# Patient Record
Sex: MALE | Race: WHITE | NOT HISPANIC OR LATINO | URBAN - METROPOLITAN AREA
[De-identification: names, ages, dates, MRNs, and addresses within clinical notes are randomized per-mention and may not be internally consistent; named-entity substitution may affect disease eponyms.]

---

## 2023-10-06 ENCOUNTER — OUTPATIENT (OUTPATIENT)
Dept: OUTPATIENT SERVICES | Facility: HOSPITAL | Age: 33
LOS: 1 days | Discharge: STILL ADMITTED OR ADMINISTRATIVE | End: 2023-10-06
Payer: COMMERCIAL

## 2023-10-06 LAB
FLUAV AG NPH QL: SIGNIFICANT CHANGE UP
FLUBV AG NPH QL: SIGNIFICANT CHANGE UP
RSV RNA NPH QL NAA+NON-PROBE: SIGNIFICANT CHANGE UP
SARS-COV-2 RNA SPEC QL NAA+PROBE: SIGNIFICANT CHANGE UP

## 2023-10-06 PROCEDURE — 90839 PSYTX CRISIS INITIAL 60 MIN: CPT

## 2023-12-04 PROBLEM — Z00.00 ENCOUNTER FOR PREVENTIVE HEALTH EXAMINATION: Status: ACTIVE | Noted: 2023-12-04

## 2024-03-04 RX ORDER — BENZTROPINE MESYLATE 1 MG
1.5 TABLET ORAL
Qty: 45 | Refills: 0
Start: 2024-03-04 | End: 2024-04-02

## 2024-03-04 RX ORDER — BENZTROPINE MESYLATE 1 MG
1.5 TABLET ORAL
Qty: 45 | Refills: 0
Start: 2024-03-04 | End: 2024-05-18

## 2024-03-04 RX ORDER — IPRATROPIUM BROMIDE 21 MCG
2 AEROSOL, SPRAY (ML) NASAL
Qty: 1 | Refills: 0
Start: 2024-03-04 | End: 2024-04-02

## 2024-03-07 RX ORDER — BUPROPION HYDROCHLORIDE 150 MG/1
3 TABLET, EXTENDED RELEASE ORAL
Qty: 90 | Refills: 0
Start: 2024-03-07 | End: 2024-07-02

## 2024-03-07 RX ORDER — BUPROPION HYDROCHLORIDE 150 MG/1
3 TABLET, EXTENDED RELEASE ORAL
Qty: 90 | Refills: 0
Start: 2024-03-07 | End: 2024-04-05

## 2024-03-07 RX ORDER — BUPROPION HYDROCHLORIDE 150 MG/1
1 TABLET, EXTENDED RELEASE ORAL
Qty: 30 | Refills: 0 | DISCHARGE
Start: 2024-03-07 | End: 2024-04-05

## 2024-03-07 RX ORDER — BUPROPION HYDROCHLORIDE 150 MG/1
1 TABLET, EXTENDED RELEASE ORAL
Qty: 30 | Refills: 0
Start: 2024-03-07 | End: 2024-04-05

## 2024-03-20 RX ORDER — CLOZAPINE 150 MG/1
1 TABLET, ORALLY DISINTEGRATING ORAL
Qty: 60 | Refills: 0
Start: 2024-03-20 | End: 2024-04-18

## 2024-03-20 RX ORDER — CLOZAPINE 150 MG/1
2 TABLET, ORALLY DISINTEGRATING ORAL
Qty: 60 | Refills: 0
Start: 2024-03-20 | End: 2024-04-18

## 2024-03-20 RX ORDER — ARIPIPRAZOLE 15 MG/1
1 TABLET ORAL
Qty: 30 | Refills: 0
Start: 2024-03-20 | End: 2024-04-18

## 2024-03-20 RX ORDER — BENZTROPINE MESYLATE 1 MG
1 TABLET ORAL
Qty: 30 | Refills: 0
Start: 2024-03-20 | End: 2024-04-18

## 2024-03-20 RX ORDER — ARIPIPRAZOLE 15 MG/1
1 TABLET ORAL
Qty: 30 | Refills: 0 | DISCHARGE
Start: 2024-03-20 | End: 2024-04-18

## 2024-03-20 RX ORDER — ARIPIPRAZOLE 15 MG/1
1 TABLET ORAL
Qty: 30 | Refills: 0
Start: 2024-03-20 | End: 2024-05-18

## 2024-04-04 RX ORDER — BUPROPION HYDROCHLORIDE 150 MG/1
3 TABLET, EXTENDED RELEASE ORAL
Qty: 90 | Refills: 0
Start: 2024-04-04 | End: 2024-05-03

## 2024-04-14 ENCOUNTER — INPATIENT (INPATIENT)
Facility: HOSPITAL | Age: 34
LOS: 1 days | Discharge: HOME | End: 2024-04-16
Attending: INTERNAL MEDICINE | Admitting: INTERNAL MEDICINE
Payer: MEDICAID

## 2024-04-14 VITALS
RESPIRATION RATE: 16 BRPM | OXYGEN SATURATION: 100 % | HEART RATE: 85 BPM | DIASTOLIC BLOOD PRESSURE: 76 MMHG | SYSTOLIC BLOOD PRESSURE: 115 MMHG | TEMPERATURE: 98 F

## 2024-04-14 DIAGNOSIS — R25.1 TREMOR, UNSPECIFIED: ICD-10-CM

## 2024-04-14 LAB
ALBUMIN SERPL ELPH-MCNC: 4.4 G/DL — SIGNIFICANT CHANGE UP (ref 3.3–5)
ALP SERPL-CCNC: 82 U/L — SIGNIFICANT CHANGE UP (ref 40–120)
ALT FLD-CCNC: 39 U/L — SIGNIFICANT CHANGE UP (ref 4–41)
AMPHET UR-MCNC: NEGATIVE — SIGNIFICANT CHANGE UP
ANION GAP SERPL CALC-SCNC: 10 MMOL/L — SIGNIFICANT CHANGE UP (ref 7–14)
APAP SERPL-MCNC: <10 UG/ML — LOW (ref 15–25)
APPEARANCE UR: CLEAR — SIGNIFICANT CHANGE UP
AST SERPL-CCNC: 23 U/L — SIGNIFICANT CHANGE UP (ref 4–40)
BACTERIA # UR AUTO: NEGATIVE /HPF — SIGNIFICANT CHANGE UP
BARBITURATES UR SCN-MCNC: NEGATIVE — SIGNIFICANT CHANGE UP
BASOPHILS # BLD AUTO: 0.03 K/UL — SIGNIFICANT CHANGE UP (ref 0–0.2)
BASOPHILS NFR BLD AUTO: 0.7 % — SIGNIFICANT CHANGE UP (ref 0–2)
BENZODIAZ UR-MCNC: NEGATIVE — SIGNIFICANT CHANGE UP
BILIRUB SERPL-MCNC: 0.2 MG/DL — SIGNIFICANT CHANGE UP (ref 0.2–1.2)
BILIRUB UR-MCNC: NEGATIVE — SIGNIFICANT CHANGE UP
BUN SERPL-MCNC: 17 MG/DL — SIGNIFICANT CHANGE UP (ref 7–23)
CALCIUM SERPL-MCNC: 9.4 MG/DL — SIGNIFICANT CHANGE UP (ref 8.4–10.5)
CAST: 0 /LPF — SIGNIFICANT CHANGE UP (ref 0–4)
CHLORIDE SERPL-SCNC: 105 MMOL/L — SIGNIFICANT CHANGE UP (ref 98–107)
CO2 SERPL-SCNC: 27 MMOL/L — SIGNIFICANT CHANGE UP (ref 22–31)
COCAINE METAB.OTHER UR-MCNC: NEGATIVE — SIGNIFICANT CHANGE UP
COLOR SPEC: YELLOW — SIGNIFICANT CHANGE UP
CREAT SERPL-MCNC: 0.91 MG/DL — SIGNIFICANT CHANGE UP (ref 0.5–1.3)
CREATININE URINE RESULT, DAU: 60 MG/DL — SIGNIFICANT CHANGE UP
DIFF PNL FLD: NEGATIVE — SIGNIFICANT CHANGE UP
EGFR: 114 ML/MIN/1.73M2 — SIGNIFICANT CHANGE UP
EOSINOPHIL # BLD AUTO: 0.28 K/UL — SIGNIFICANT CHANGE UP (ref 0–0.5)
EOSINOPHIL NFR BLD AUTO: 6.3 % — HIGH (ref 0–6)
ETHANOL SERPL-MCNC: <10 MG/DL — SIGNIFICANT CHANGE UP
GLUCOSE SERPL-MCNC: 107 MG/DL — HIGH (ref 70–99)
GLUCOSE UR QL: NEGATIVE MG/DL — SIGNIFICANT CHANGE UP
HCT VFR BLD CALC: 41.5 % — SIGNIFICANT CHANGE UP (ref 39–50)
HGB BLD-MCNC: 13.9 G/DL — SIGNIFICANT CHANGE UP (ref 13–17)
IANC: 2.89 K/UL — SIGNIFICANT CHANGE UP (ref 1.8–7.4)
IMM GRANULOCYTES NFR BLD AUTO: 0.2 % — SIGNIFICANT CHANGE UP (ref 0–0.9)
KETONES UR-MCNC: NEGATIVE MG/DL — SIGNIFICANT CHANGE UP
LEUKOCYTE ESTERASE UR-ACNC: NEGATIVE — SIGNIFICANT CHANGE UP
LYMPHOCYTES # BLD AUTO: 0.84 K/UL — LOW (ref 1–3.3)
LYMPHOCYTES # BLD AUTO: 19 % — SIGNIFICANT CHANGE UP (ref 13–44)
MAGNESIUM SERPL-MCNC: 2.1 MG/DL — SIGNIFICANT CHANGE UP (ref 1.6–2.6)
MCHC RBC-ENTMCNC: 30.5 PG — SIGNIFICANT CHANGE UP (ref 27–34)
MCHC RBC-ENTMCNC: 33.5 GM/DL — SIGNIFICANT CHANGE UP (ref 32–36)
MCV RBC AUTO: 91 FL — SIGNIFICANT CHANGE UP (ref 80–100)
METHADONE UR-MCNC: NEGATIVE — SIGNIFICANT CHANGE UP
MONOCYTES # BLD AUTO: 0.36 K/UL — SIGNIFICANT CHANGE UP (ref 0–0.9)
MONOCYTES NFR BLD AUTO: 8.2 % — SIGNIFICANT CHANGE UP (ref 2–14)
NEUTROPHILS # BLD AUTO: 2.89 K/UL — SIGNIFICANT CHANGE UP (ref 1.8–7.4)
NEUTROPHILS NFR BLD AUTO: 65.6 % — SIGNIFICANT CHANGE UP (ref 43–77)
NITRITE UR-MCNC: NEGATIVE — SIGNIFICANT CHANGE UP
NRBC # BLD: 0 /100 WBCS — SIGNIFICANT CHANGE UP (ref 0–0)
NRBC # FLD: 0 K/UL — SIGNIFICANT CHANGE UP (ref 0–0)
OPIATES UR-MCNC: NEGATIVE — SIGNIFICANT CHANGE UP
OXYCODONE UR-MCNC: NEGATIVE — SIGNIFICANT CHANGE UP
PCP SPEC-MCNC: SIGNIFICANT CHANGE UP
PCP UR-MCNC: NEGATIVE — SIGNIFICANT CHANGE UP
PH UR: 7 — SIGNIFICANT CHANGE UP (ref 5–8)
PHOSPHATE SERPL-MCNC: 3 MG/DL — SIGNIFICANT CHANGE UP (ref 2.5–4.5)
PLATELET # BLD AUTO: 241 K/UL — SIGNIFICANT CHANGE UP (ref 150–400)
POTASSIUM SERPL-MCNC: 4.3 MMOL/L — SIGNIFICANT CHANGE UP (ref 3.5–5.3)
POTASSIUM SERPL-SCNC: 4.3 MMOL/L — SIGNIFICANT CHANGE UP (ref 3.5–5.3)
PROT SERPL-MCNC: 7.3 G/DL — SIGNIFICANT CHANGE UP (ref 6–8.3)
PROT UR-MCNC: SIGNIFICANT CHANGE UP MG/DL
RBC # BLD: 4.56 M/UL — SIGNIFICANT CHANGE UP (ref 4.2–5.8)
RBC # FLD: 12.2 % — SIGNIFICANT CHANGE UP (ref 10.3–14.5)
RBC CASTS # UR COMP ASSIST: 0 /HPF — SIGNIFICANT CHANGE UP (ref 0–4)
SALICYLATES SERPL-MCNC: <0.3 MG/DL — LOW (ref 15–30)
SODIUM SERPL-SCNC: 142 MMOL/L — SIGNIFICANT CHANGE UP (ref 135–145)
SP GR SPEC: 1.02 — SIGNIFICANT CHANGE UP (ref 1–1.03)
SQUAMOUS # UR AUTO: 0 /HPF — SIGNIFICANT CHANGE UP (ref 0–5)
THC UR QL: NEGATIVE — SIGNIFICANT CHANGE UP
TOXICOLOGY SCREEN, DRUGS OF ABUSE, SERUM RESULT: SIGNIFICANT CHANGE UP
UROBILINOGEN FLD QL: 1 MG/DL — SIGNIFICANT CHANGE UP (ref 0.2–1)
WBC # BLD: 4.41 K/UL — SIGNIFICANT CHANGE UP (ref 3.8–10.5)
WBC # FLD AUTO: 4.41 K/UL — SIGNIFICANT CHANGE UP (ref 3.8–10.5)
WBC UR QL: 0 /HPF — SIGNIFICANT CHANGE UP (ref 0–5)

## 2024-04-14 PROCEDURE — 99223 1ST HOSP IP/OBS HIGH 75: CPT

## 2024-04-14 PROCEDURE — 71046 X-RAY EXAM CHEST 2 VIEWS: CPT | Mod: 26

## 2024-04-14 PROCEDURE — 99285 EMERGENCY DEPT VISIT HI MDM: CPT

## 2024-04-14 RX ORDER — BUPROPION HYDROCHLORIDE 150 MG/1
450 TABLET, EXTENDED RELEASE ORAL DAILY
Refills: 0 | Status: DISCONTINUED | OUTPATIENT
Start: 2024-04-14 | End: 2024-04-15

## 2024-04-14 RX ORDER — CLOZAPINE 150 MG/1
550 TABLET, ORALLY DISINTEGRATING ORAL AT BEDTIME
Refills: 0 | Status: DISCONTINUED | OUTPATIENT
Start: 2024-04-14 | End: 2024-04-14

## 2024-04-14 RX ORDER — CLOZAPINE 150 MG/1
550 TABLET, ORALLY DISINTEGRATING ORAL
Refills: 0 | DISCHARGE

## 2024-04-14 RX ORDER — INFLUENZA VIRUS VACCINE 15; 15; 15; 15 UG/.5ML; UG/.5ML; UG/.5ML; UG/.5ML
0.5 SUSPENSION INTRAMUSCULAR ONCE
Refills: 0 | Status: DISCONTINUED | OUTPATIENT
Start: 2024-04-14 | End: 2024-04-16

## 2024-04-14 RX ORDER — ARIPIPRAZOLE 15 MG/1
10 TABLET ORAL AT BEDTIME
Refills: 0 | Status: DISCONTINUED | OUTPATIENT
Start: 2024-04-14 | End: 2024-04-15

## 2024-04-14 RX ORDER — CLOZAPINE 150 MG/1
100 TABLET, ORALLY DISINTEGRATING ORAL DAILY
Refills: 0 | Status: DISCONTINUED | OUTPATIENT
Start: 2024-04-14 | End: 2024-04-15

## 2024-04-14 RX ORDER — BENZTROPINE MESYLATE 1 MG
2 TABLET ORAL AT BEDTIME
Refills: 0 | Status: DISCONTINUED | OUTPATIENT
Start: 2024-04-14 | End: 2024-04-16

## 2024-04-14 RX ORDER — CLOZAPINE 150 MG/1
450 TABLET, ORALLY DISINTEGRATING ORAL DAILY
Refills: 0 | Status: DISCONTINUED | OUTPATIENT
Start: 2024-04-14 | End: 2024-04-15

## 2024-04-14 RX ADMIN — CLOZAPINE 450 MILLIGRAM(S): 150 TABLET, ORALLY DISINTEGRATING ORAL at 23:50

## 2024-04-14 RX ADMIN — Medication 2 MILLIGRAM(S): at 23:12

## 2024-04-14 RX ADMIN — CLOZAPINE 100 MILLIGRAM(S): 150 TABLET, ORALLY DISINTEGRATING ORAL at 23:50

## 2024-04-14 RX ADMIN — ARIPIPRAZOLE 10 MILLIGRAM(S): 15 TABLET ORAL at 23:12

## 2024-04-14 NOTE — H&P ADULT - ASSESSMENT
33 y/o M with history of mood disorder with depression and anxiety p/w  involuntary spasms and legs giving out.

## 2024-04-14 NOTE — ED PROVIDER NOTE - OBJECTIVE STATEMENT
34m presenting to the ed with concern for involuntary spasms and legs giving out.  Patient reports he woke up feeling at baseline, and today while standing, felt his legs give out. Felt generally shaky and also that different parts of his body are spasming involuntarily. He fell at the time, landed on his behind, did not hit his head, no loc, and did not feel lightheaded or dizzy. Reports this happened multiple times that his body is having involuntary jerks and spasms and has several near falls due to this (though did not fall again). Has been fully conscious through all episodes, is still continuing to experience smaller jerks in the bed, no ha, vision changes, unilateral weakness, numbness, back pain, cp, sob, cough, fevers, vomiting, diarrhea, dysuria. This never happened before. no urinary or fecal incont during episodes. Takes multiple medications including

## 2024-04-14 NOTE — H&P ADULT - NSHPLABSRESULTS_GEN_ALL_CORE
13.9   4.41  )-----------( 241      ( 2024 14:50 )             41.5     142  |  105  |  17  ----------------------------<  107<H>       4.3   |  27  |  0.91    Ca    9.4      2024 14:50  Phos  3.0       Mg     2.10         TPro  7.3  /  Alb  4.4  /  TBili  0.2  /  DBili  x   /  AST  23  /  ALT  39  /  AlkPhos  82                Urinalysis Basic - ( 2024 15:24 )  Color: Yellow / Appearance: Clear / S.016 / pH: 7.0  Gluc: Negative mg/dL / Ketone: Negative mg/dL  / Bili: Negative / Urobili: 1.0 mg/dL   Blood: Negative / Protein: Trace mg/dL / Nitrite: Negative   Leuk Esterase: Negative / RBC: 0 /HPF / WBC 0 /HPF   Sq Epi: x / Non Sq Epi: x / Bacteria: Negative /HPF

## 2024-04-14 NOTE — ED ADULT NURSE NOTE - NSFALLRISKINTERV_ED_ALL_ED

## 2024-04-14 NOTE — CONSULT NOTE ADULT - SUBJECTIVE AND OBJECTIVE BOX
Neurology - Consult Note    Spectra: [phone] ([facility]), [phone] ([facility])    HPI: 34-year old past medical history of anxiety/depression presenting to the ED for c/f spasms and legs giving out.  Neurology consulted for spasms.    Patient + father at bedside reports 1100 on day of presentation, patient's legs 'buckled' without any prior notice, and he fell onto his behind, did not injure his head. Reports he has had several near falls since then. Also advocates between 1100 to 1300, had multiple episodes (~20) of both L + R arm jerking lasting a few seconds in duration, sometimes asociated with the legs buckling, sometimes only involving the upper extremity. Reports this has never happened prior to today.    Reports he has been fully aware during this episodes, no tongue bite/incont.  Medication changes: reports abilify was increased from 5->10 2 weeks prior, lorazepam was decreased from 1mg -> 0.5 mg 2 weeks prior. Reports 3-4 weeks prior clozapine was decreased from 600 -> 550. Cogentin was started (2mg) 8 weeks prior to presentation for movement, father + patient advocate for 'walking + arm swinging,' no documentation available per chart review. Follows with a psychiatrist outpt does not see a neurologist.  No CNS/febrile seizures/birthabnl/FHseizures. No substance use.    Vitals stable  Labs unremarkable tox negative UA negative    Review of Systems:  NEUROLOGICAL: +As stated in HPI above  All other review of systems is negative unless indicated above.    Allergies:  No Known Allergies      PMHx/PSHx/Family Hx: As above, otherwise see below   History of anxiety    H/O: depression        Social Hx:  as above    Vitals:  T(C): 36.6 (04-14-24 @ 15:49), Max: 36.7 (04-14-24 @ 13:37)  HR: 78 (04-14-24 @ 15:49) (78 - 85)  BP: 114/78 (04-14-24 @ 15:49) (114/78 - 115/76)  RR: 16 (04-14-24 @ 15:49) (16 - 16)  SpO2: 100% (04-14-24 @ 15:49) (100% - 100%)    Physical Examination:  General - non-toxic appearing male in no acute distress  Neurologic Exam:  Mental status - Awake, Alert, Oriented to person, time, states '[facility] [place]' for location. Speech fluent, repetition and naming intact. Follows simple commands. Delayed/slightly flat affeect.  Cranial nerves - PERRLA (5mm -> 4.5mm b/l), VFF, EOMI, face sensation (V1-V3) intact b/l, facial strength intact without asymmetry b/l, hearing intact b/l, palate with symmetric elevation,  sternocleidomastiod 5/5 strength b/l, tongue midline on protrusion with full lateral movement  Motor - Normal bulk and tone throughout. No pronator drift.  Strength testing            Deltoid      Biceps      Triceps     Wrist Extension    Wrist Flexion            R            5                 5               5                     5                              5                        5  L             5                 5               5                     5                              5                         5              Hip Flexion       Knee Flexion    Knee Extension    Dorsiflexion    Plantar Flexion  R              5                            5                           5                            5                          5  L              5                            5                           5                            5                          5  Sensation - Light touch intact throughout  DTR's -             Biceps      Triceps     Brachioradialis      Patellar    Ankle    Toes  R             2+             2+                  2+                       2+            2+                  L              2+             2+                 2+                        2+           2+                  Coordination - Finger to Nose intact b/l. No tremors appreciated  Gait and station - Normal casual gait.     Labs:                        13.9   4.41  )-----------( 241      ( 14 Apr 2024 14:50 )             41.5     04-14    142  |  105  |  17  ----------------------------<  107<H>  4.3   |  27  |  0.91    Ca    9.4      14 Apr 2024 14:50  Phos  3.0     04-14  Mg     2.10     04-14    TPro  7.3  /  Alb  4.4  /  TBili  0.2  /  DBili  x   /  AST  23  /  ALT  39  /  AlkPhos  82  04-14    CAPILLARY BLOOD GLUCOSE        LIVER FUNCTIONS - ( 14 Apr 2024 14:50 )  Alb: 4.4 g/dL / Pro: 7.3 g/dL / ALK PHOS: 82 U/L / ALT: 39 U/L / AST: 23 U/L / GGT: x                 Radiology:

## 2024-04-14 NOTE — ED ADULT NURSE NOTE - NS ED NURSE REPORT GIVEN DT
Patient returned call and I relayed message below. Patient states that he has a hard time falling asleep at night and once asleep he only sleeps a couple hours at a time. Also for the constipation patient states that he thought that the preparation was working for a couple days and no longer working  and states he has a lot of blood when he has bowl movements. Please advise and call patient if needed please and thank you.   14-Apr-2024 21:48

## 2024-04-14 NOTE — H&P ADULT - NSHPPHYSICALEXAM_GEN_ALL_CORE
Vital Signs Last 24 Hrs  T(C): 36.6 (14 Apr 2024 23:30), Max: 36.7 (14 Apr 2024 13:37)  T(F): 97.8 (14 Apr 2024 23:30), Max: 98 (14 Apr 2024 13:37)  HR: 115 (14 Apr 2024 23:30) (74 - 115)  BP: 102/64 (14 Apr 2024 23:30) (102/64 - 117/76)  BP(mean): --  RR: 18 (14 Apr 2024 23:30) (15 - 18)  SpO2: 100% (14 Apr 2024 23:30) (100% - 100%)    Parameters below as of 14 Apr 2024 23:30  Patient On (Oxygen Delivery Method): room air        PHYSICAL EXAM:  GENERAL: No Acute Distress  EYES: conjunctiva and sclera clear  ENMT: Moist mucous membranes   NECK: Supple  PULMONARY: Clear to auscultation bilaterally  CARDIAC: Regular rate and rhythm; No murmurs, rubs, or gallops  GASTROINTESTINAL: Abdomen soft, Nontender, Nondistended; Bowel sounds normal  EXTREMITIES:   No clubbing, cyanosis, or pedal edema  PSYCH: constricted affect  NEUROLOGY:   - Mental status A&O x 3  - occasional myoclonic jerk noted  SKIN: No rashes or lesions  MUSCULOSKELETAL: No joint swelling

## 2024-04-14 NOTE — ED PROVIDER NOTE - PHYSICAL EXAMINATION
GEN - NAD; nontoxic appearing; A+O x3   HEAD - NC/AT   EYES- PERRL, EOMI  ENT: Airway patent, mmm, Oral cavity and pharynx normal. No inflammation, swelling, exudate, or lesions.    NECK: Neck supple  PULMONARY - CTA b/l, symmetric breath sounds.   CARDIAC -s1s2, RRR, no M,G,R  ABDOMEN - +BS, ND, NT, soft, no guarding, no rebound, no masses   BACK - no CVA tenderness, Normal  spine   EXTREMITIES - FROM, symmetric pulses, capillary refill < 2 seconds, no edema   SKIN - no rash or bruising   NEUROLOGIC - ao3, cn2-12 intact, 5/5 strength in all extremities, sensation grossly intact, f-n nl, +noted facial isolated and intermittent facial/mouth spasms during evaluation  PSYCH -linear, calm, cooperative, in good behavioral control

## 2024-04-14 NOTE — ED ADULT NURSE NOTE - CHIEF COMPLAINT QUOTE
pt s family brought pt in after experiencing seizures pt had multiple ana hand movements that's not normal for him  told by [facility] docs to bring pt in pt has pych issues

## 2024-04-14 NOTE — ED ADULT NURSE NOTE - OBJECTIVE STATEMENT
35 y/o M arrives to E.D. spot 1-A c/o involuntary hand movements (chronic, but worse this AM), and bilat leg weakness that began this AM. PMHx: anxiety, depression (clozapine, aripiprazole, buproprion, ativan, cogentin). Pt is a&ox4, ambulatory at baseline, neg SOB, denies CP, neg N/V/D. L 20g IV placed to AC. Endorses falling this AM d/t bilat leg weakness. Denies head injury or LOC. Denies S/I, H/I, hallucinations or ETOH/drug use. Calm & cooperative. Mother at bedside. Frequent rounding in place.

## 2024-04-14 NOTE — CONSULT NOTE ADULT - ASSESSMENT
ASSESSMENT   34-year old past medical history of anxiety/depression presenting to the ED for c/f spasms and legs giving out.  Neurology consulted for spasms.    IMPRESSION   Involuntary, intermittent episodes of 'spasms', may be myoclonic jerks. Etiology pending further workup, may be medication related or metabolic. Would rule out epileptic events, though lower suspicion for. No clinical findings for myelopathy or acute ischemic event.    RECOMMENDATION   [] vEEG  [] no need for anti-seizure medications at this time  [] please obtain TSH, vitamin E    Discussed with on call neurology attending.  Patient to be seen by team and attending. Note finalized upon attending attestation.

## 2024-04-14 NOTE — ED PROVIDER NOTE - CLINICAL SUMMARY MEDICAL DECISION MAKING FREE TEXT BOX
Involuntary spasms/jerking/b/l leg buckling since this morning with one fall (no head trauma/loc/dizziness), and several near falls due to jerking. No loc, retains fully memory of these event, no trauma or injury from fall, no e/o injury on exam. Plan for labs and neuro c/s to eval for elec dist/organ dysfunction/med effects, f/u neuro recs for poss further investigation. Patient, mother, and father at bedside aware and agree to this plan.

## 2024-04-14 NOTE — CONSULT NOTE ADULT - ATTENDING COMMENTS
seen and examined on rounds    34m with severe depression but no prior hosp  on multiple meds  recent adjustment (2w ago) with increase in aripiprazole, and reduction of clozapine, with taper of lorazepam from 2 to 1 and addition of benztropine  in this setting had a few epiosdes of loss of tone in legs without LOC, and some myoclonic jerks  today they are improved  labs unrevealing    here with father, works as  from home  no tob/etoh/drugs    on exam constricted affect, but otherwise normal including MSE, CN, motor, tone, gait and DTRs  no asterixis or abnl movements noted    impression is likely med effect - myoclonus, most likely med related    will reach out to outpatient psych  monitor for recurrence  VEEG for 24 hours   no seizure meds for now

## 2024-04-14 NOTE — H&P ADULT - NSHPREVIEWOFSYSTEMS_GEN_ALL_CORE
Review of Systems:   CONSTITUTIONAL: No fever or chills  EYES: No eye pain, visual disturbances, or discharge  ENMT:  No difficulty hearing, no throat pain  NECK: No pain or stiffness  RESPIRATORY: No cough, No shortness of breath  CARDIOVASCULAR: No chest pain, palpitations, dizziness, or leg swelling  GASTROINTESTINAL: No abdominal pain, nausea, vomiting or diarrhea  GENITOURINARY: No dysuria, or hematuria  NEUROLOGICAL: jerking/spasms as above   SKIN: No rashes, or lesions   LYMPH NODES: No enlarged glands  ENDOCRINE: No heat or cold intolerance  MUSCULOSKELETAL: No joint pain or swelling  PSYCHIATRIC: Depression, anxiety  HEME/LYMPH: No easy bruising, or bleeding  ALLERGY AND IMMUNOLOGIC: No hives or eczema

## 2024-04-14 NOTE — ED ADULT NURSE REASSESSMENT NOTE - NS ED NURSE REASSESS COMMENT FT1
Received patient awake, in stretcher, breathing with ease on room air. Denies any complaints at this time. Admitted and awaiting bed. Eating meal at this time. Family at bedside.
BREAK RN: pt. remains A&Ox4, awake and resting. pt. offers no new complaints at this time. no acute distress noted. respirations even and unlabored. VS as noted.

## 2024-04-14 NOTE — ED PROVIDER NOTE - PROGRESS NOTE DETAILS
Kayla PGY3: neurology consulted for likely seizure activities. patient was evaluated by Neurology at bedside. Per Neurology, patient will be admitted to medicine for EEG.

## 2024-04-14 NOTE — H&P ADULT - HISTORY OF PRESENT ILLNESS
35 y/o M with history of mood disorder with depression and anxiety p/w  involuntary spasms and legs giving out.  Patient reports starting today, he began having spams involving his upper and lower extremities as well as trunk and neck.  His LE spasms have caused his legs to give out causing him to fall and land on his buttocks.  He reports he has been conscious during these spasms except for 1 episode today where he blacked out for about 1 second.  He has not experienced sensory aura.  No incontinence or tongue biting.  No recent illness.  Pt reports his psych medications have been adjusted over the past few weeks (including increase of Abilify and cogentin, and decrease of lorazepam, and clozapine.  He has felt depressed, but reports no SI or AVH.    In the ED, pt was evaluated by neurology.

## 2024-04-15 DIAGNOSIS — F41.8 OTHER SPECIFIED ANXIETY DISORDERS: ICD-10-CM

## 2024-04-15 DIAGNOSIS — G25.3 MYOCLONUS: ICD-10-CM

## 2024-04-15 LAB
ANION GAP SERPL CALC-SCNC: 14 MMOL/L — SIGNIFICANT CHANGE UP (ref 7–14)
BASOPHILS # BLD AUTO: 0.03 K/UL — SIGNIFICANT CHANGE UP (ref 0–0.2)
BASOPHILS NFR BLD AUTO: 0.3 % — SIGNIFICANT CHANGE UP (ref 0–2)
BUN SERPL-MCNC: 15 MG/DL — SIGNIFICANT CHANGE UP (ref 7–23)
CALCIUM SERPL-MCNC: 8.8 MG/DL — SIGNIFICANT CHANGE UP (ref 8.4–10.5)
CHLORIDE SERPL-SCNC: 106 MMOL/L — SIGNIFICANT CHANGE UP (ref 98–107)
CO2 SERPL-SCNC: 23 MMOL/L — SIGNIFICANT CHANGE UP (ref 22–31)
CREAT SERPL-MCNC: 0.81 MG/DL — SIGNIFICANT CHANGE UP (ref 0.5–1.3)
CULTURE RESULTS: SIGNIFICANT CHANGE UP
EGFR: 119 ML/MIN/1.73M2 — SIGNIFICANT CHANGE UP
EOSINOPHIL # BLD AUTO: 0.31 K/UL — SIGNIFICANT CHANGE UP (ref 0–0.5)
EOSINOPHIL NFR BLD AUTO: 3.5 % — SIGNIFICANT CHANGE UP (ref 0–6)
GLUCOSE SERPL-MCNC: 90 MG/DL — SIGNIFICANT CHANGE UP (ref 70–99)
HCT VFR BLD CALC: 38.5 % — LOW (ref 39–50)
HGB BLD-MCNC: 12.9 G/DL — LOW (ref 13–17)
IANC: 6.69 K/UL — SIGNIFICANT CHANGE UP (ref 1.8–7.4)
IMM GRANULOCYTES NFR BLD AUTO: 0.3 % — SIGNIFICANT CHANGE UP (ref 0–0.9)
LYMPHOCYTES # BLD AUTO: 1.45 K/UL — SIGNIFICANT CHANGE UP (ref 1–3.3)
LYMPHOCYTES # BLD AUTO: 16.1 % — SIGNIFICANT CHANGE UP (ref 13–44)
MAGNESIUM SERPL-MCNC: 1.9 MG/DL — SIGNIFICANT CHANGE UP (ref 1.6–2.6)
MCHC RBC-ENTMCNC: 29.9 PG — SIGNIFICANT CHANGE UP (ref 27–34)
MCHC RBC-ENTMCNC: 33.5 GM/DL — SIGNIFICANT CHANGE UP (ref 32–36)
MCV RBC AUTO: 89.1 FL — SIGNIFICANT CHANGE UP (ref 80–100)
MONOCYTES # BLD AUTO: 0.47 K/UL — SIGNIFICANT CHANGE UP (ref 0–0.9)
MONOCYTES NFR BLD AUTO: 5.2 % — SIGNIFICANT CHANGE UP (ref 2–14)
MRSA PCR RESULT.: SIGNIFICANT CHANGE UP
NEUTROPHILS # BLD AUTO: 6.69 K/UL — SIGNIFICANT CHANGE UP (ref 1.8–7.4)
NEUTROPHILS NFR BLD AUTO: 74.6 % — SIGNIFICANT CHANGE UP (ref 43–77)
NRBC # BLD: 0 /100 WBCS — SIGNIFICANT CHANGE UP (ref 0–0)
NRBC # FLD: 0 K/UL — SIGNIFICANT CHANGE UP (ref 0–0)
PHOSPHATE SERPL-MCNC: 3.4 MG/DL — SIGNIFICANT CHANGE UP (ref 2.5–4.5)
PLATELET # BLD AUTO: 224 K/UL — SIGNIFICANT CHANGE UP (ref 150–400)
POTASSIUM SERPL-MCNC: 3.7 MMOL/L — SIGNIFICANT CHANGE UP (ref 3.5–5.3)
POTASSIUM SERPL-SCNC: 3.7 MMOL/L — SIGNIFICANT CHANGE UP (ref 3.5–5.3)
RBC # BLD: 4.32 M/UL — SIGNIFICANT CHANGE UP (ref 4.2–5.8)
RBC # FLD: 12.2 % — SIGNIFICANT CHANGE UP (ref 10.3–14.5)
S AUREUS DNA NOSE QL NAA+PROBE: SIGNIFICANT CHANGE UP
SODIUM SERPL-SCNC: 143 MMOL/L — SIGNIFICANT CHANGE UP (ref 135–145)
SPECIMEN SOURCE: SIGNIFICANT CHANGE UP
TSH SERPL-MCNC: 1.33 UIU/ML — SIGNIFICANT CHANGE UP (ref 0.27–4.2)
TSH SERPL-MCNC: 2.76 UIU/ML — SIGNIFICANT CHANGE UP (ref 0.27–4.2)
TSH SERPL-MCNC: 2.77 UIU/ML — SIGNIFICANT CHANGE UP (ref 0.27–4.2)
WBC # BLD: 8.98 K/UL — SIGNIFICANT CHANGE UP (ref 3.8–10.5)
WBC # FLD AUTO: 8.98 K/UL — SIGNIFICANT CHANGE UP (ref 3.8–10.5)

## 2024-04-15 PROCEDURE — 90792 PSYCH DIAG EVAL W/MED SRVCS: CPT

## 2024-04-15 PROCEDURE — 99233 SBSQ HOSP IP/OBS HIGH 50: CPT

## 2024-04-15 PROCEDURE — 95720 EEG PHY/QHP EA INCR W/VEEG: CPT

## 2024-04-15 RX ORDER — BUPROPION HYDROCHLORIDE 150 MG/1
300 TABLET, EXTENDED RELEASE ORAL
Refills: 0 | Status: DISCONTINUED | OUTPATIENT
Start: 2024-04-15 | End: 2024-04-16

## 2024-04-15 RX ORDER — CLOZAPINE 150 MG/1
100 TABLET, ORALLY DISINTEGRATING ORAL
Refills: 0 | Status: DISCONTINUED | OUTPATIENT
Start: 2024-04-15 | End: 2024-04-15

## 2024-04-15 RX ORDER — BUPROPION HYDROCHLORIDE 150 MG/1
450 TABLET, EXTENDED RELEASE ORAL ONCE
Refills: 0 | Status: DISCONTINUED | OUTPATIENT
Start: 2024-04-15 | End: 2024-04-15

## 2024-04-15 RX ORDER — ARIPIPRAZOLE 15 MG/1
10 TABLET ORAL
Refills: 0 | Status: DISCONTINUED | OUTPATIENT
Start: 2024-04-15 | End: 2024-04-16

## 2024-04-15 RX ORDER — CHLORHEXIDINE GLUCONATE 213 G/1000ML
1 SOLUTION TOPICAL
Refills: 0 | Status: DISCONTINUED | OUTPATIENT
Start: 2024-04-15 | End: 2024-04-16

## 2024-04-15 RX ORDER — POLYETHYLENE GLYCOL 3350 17 G/17G
17 POWDER, FOR SOLUTION ORAL ONCE
Refills: 0 | Status: COMPLETED | OUTPATIENT
Start: 2024-04-15 | End: 2024-04-15

## 2024-04-15 RX ORDER — CLOZAPINE 150 MG/1
550 TABLET, ORALLY DISINTEGRATING ORAL
Refills: 0 | Status: DISCONTINUED | OUTPATIENT
Start: 2024-04-15 | End: 2024-04-16

## 2024-04-15 RX ORDER — CLOZAPINE 150 MG/1
450 TABLET, ORALLY DISINTEGRATING ORAL
Refills: 0 | Status: DISCONTINUED | OUTPATIENT
Start: 2024-04-15 | End: 2024-04-15

## 2024-04-15 RX ADMIN — CLOZAPINE 550 MILLIGRAM(S): 150 TABLET, ORALLY DISINTEGRATING ORAL at 21:19

## 2024-04-15 RX ADMIN — Medication 2 MILLIGRAM(S): at 21:19

## 2024-04-15 RX ADMIN — POLYETHYLENE GLYCOL 3350 17 GRAM(S): 17 POWDER, FOR SOLUTION ORAL at 21:27

## 2024-04-15 RX ADMIN — Medication 0.5 MILLIGRAM(S): at 14:47

## 2024-04-15 RX ADMIN — BUPROPION HYDROCHLORIDE 300 MILLIGRAM(S): 150 TABLET, EXTENDED RELEASE ORAL at 14:47

## 2024-04-15 RX ADMIN — CHLORHEXIDINE GLUCONATE 1 APPLICATION(S): 213 SOLUTION TOPICAL at 05:44

## 2024-04-15 RX ADMIN — ARIPIPRAZOLE 10 MILLIGRAM(S): 15 TABLET ORAL at 21:19

## 2024-04-15 NOTE — CHART NOTE - NSCHARTNOTEFT_GEN_A_CORE
Medication change:   - Pt was taking Wellbutrin 450mg daily at home. However, per pt's father, he spoke/emailed outpatient psychiatrist Dr. Ken Negro who confirmed that 300mg of wellbutrin is fine.   - I emailed, called and left voice mail to confirm the medication change with Dr. Alvarenga, however was not successful at this time. As pt was passed for his medication, 300mg dose was given. House psych team was called for further evaluation of medication management and symptom.   - Case was discussed with Dr. Avinash brady

## 2024-04-15 NOTE — PROGRESS NOTE ADULT - SUBJECTIVE AND OBJECTIVE BOX
Patient is a 34y old  Male who presents with a chief complaint of spasms (14 Apr 2024 23:57)      SUBJECTIVE / OVERNIGHT EVENTS: EEG placed    MEDICATIONS  (STANDING):  ARIPiprazole 10 milliGRAM(s) Oral <User Schedule>  benztropine 2 milliGRAM(s) Oral at bedtime  buPROPion XL (24-Hour) . 300 milliGRAM(s) Oral <User Schedule>  chlorhexidine 2% Cloths 1 Application(s) Topical <User Schedule>  cloZAPine 550 milliGRAM(s) Oral <User Schedule>  influenza   Vaccine 0.5 milliLiter(s) IntraMuscular once    MEDICATIONS  (PRN):      Vital Signs Last 24 Hrs  T(F): 98.1 (04-15-24 @ 14:00), Max: 98.1 (04-15-24 @ 14:00)  HR: 93 (04-15-24 @ 14:00) (74 - 115)  BP: 114/62 (04-15-24 @ 14:00) (102/64 - 116/74)  RR: 18 (04-15-24 @ 14:00) (17 - 18)  SpO2: 100% (04-15-24 @ 14:00) (100% - 100%)  Telemetry:   CAPILLARY BLOOD GLUCOSE        I&O's Summary      PHYSICAL EXAM:  GENERAL: NAD, well-developed  HEAD:  Atraumatic, Normocephalic  EYES: EOMI, PERRLA, conjunctiva and sclera clear  NECK: Supple, No JVD  CHEST/LUNG: Clear to auscultation bilaterally; No wheeze  HEART: Regular rate and rhythm; No murmurs, rubs, or gallops  ABDOMEN: Soft, Nontender, Nondistended; Bowel sounds present  EXTREMITIES:  2+ Peripheral Pulses, No clubbing, cyanosis, or edema  PSYCH: AAOx3  NEUROLOGY: non-focal  SKIN: No rashes or lesions    LABS:                        12.9   8.98  )-----------( 224      ( 15 Apr 2024 05:30 )             38.5     04-15    143  |  106  |  15  ----------------------------<  90  3.7   |  23  |  0.81    Ca    8.8      15 Apr 2024 05:30  Phos  3.4     04-15  Mg     1.90     04-15    TPro  7.3  /  Alb  4.4  /  TBili  0.2  /  DBili  x   /  AST  23  /  ALT  39  /  AlkPhos  82  04-14          Urinalysis Basic - ( 15 Apr 2024 05:30 )    Color: x / Appearance: x / SG: x / pH: x  Gluc: 90 mg/dL / Ketone: x  / Bili: x / Urobili: x   Blood: x / Protein: x / Nitrite: x   Leuk Esterase: x / RBC: x / WBC x   Sq Epi: x / Non Sq Epi: x / Bacteria: x        RADIOLOGY & ADDITIONAL TESTS:    Imaging Personally Reviewed:    Consultant(s) Notes Reviewed:      Care Discussed with Consultants/Other Providers:

## 2024-04-15 NOTE — BH CONSULTATION LIAISON ASSESSMENT NOTE - RISK ASSESSMENT
At this time, patient is not at imminent risk of harm to self or others. Risk factors include psychotic disorder, acute medical illness, limited insight.   Protective factors include social support from family, denial of current SI/HI, help-seeking behavior, future plans, engagement in safety and treatment planning.

## 2024-04-15 NOTE — BH CONSULTATION LIAISON ASSESSMENT NOTE - NSBHATTESTCOMMENTATTENDFT_PSY_A_CORE
Patient seen and examined with Dr. Griffin. Patient is anxious and blunted. Denies acute psychotic symptoms. States he is anxious. Does not appear internally preoccupied. Thought process is grossly linear. Speech is noticeable for decreased spontaneity.    Collaborated with Dr. HAMPTON as per above.  Plan per above.    Will follow while admitted to [facility].  Would avoid antipsychotics PRN- can use ativan prn for now- do not want to further lower seizure threshold.

## 2024-04-15 NOTE — BH CONSULTATION LIAISON ASSESSMENT NOTE - NSBHCHARTREVIEWVS_PSY_A_CORE FT
Vital Signs Last 24 Hrs  T(C): 36.7 (15 Apr 2024 14:00), Max: 36.7 (14 Apr 2024 21:14)  T(F): 98.1 (15 Apr 2024 14:00), Max: 98.1 (15 Apr 2024 14:00)  HR: 93 (15 Apr 2024 14:00) (74 - 115)  BP: 114/62 (15 Apr 2024 14:00) (102/64 - 117/76)  BP(mean): --  RR: 18 (15 Apr 2024 14:00) (15 - 18)  SpO2: 100% (15 Apr 2024 14:00) (100% - 100%)    Parameters below as of 15 Apr 2024 14:00  Patient On (Oxygen Delivery Method): room air

## 2024-04-15 NOTE — CHART NOTE - NSCHARTNOTEFT_GEN_A_CORE
EEG preliminary read (not final) on the initial recording hour(s) = x 1     No seizures recorded.  Mild slowing noted, nonspecific.  Final report to follow tomorrow morning after completion of study.    [facility] EEG Reading Room Ph#: [phone]  Epilepsy Answering Service after 5PM and before 8:30AM: Ph#: [phone]

## 2024-04-15 NOTE — BH CONSULTATION LIAISON ASSESSMENT NOTE - SUMMARY
Pt is a 33 y/o m, PPHx of schizophrenia, depression, anxiety, current outpt treatment with Dr. Negro x 2 months, no hx of inpt hospitalization, no hx of SI/SA/NSSIB/HI, no substance use, no pertinent PMHx, presenting with involuntary spasms and legs giving out. Admitted for neurological workup. Psychiatry consulted for medication management.     On assessment, pt does not present with any acute positive symptoms of psychosis. Endorses periods of depression with wt loss. Pt has more prominent negative symptoms. No SI/HI. Care coordinated with pt's father and outpt psychiatrist. Continue on current medication regime.     Recommendations  - No 1:1 CO necessary, as patient is not at increased risk of SI/HI/violence. Monitor behaviors and have a low threshold for putting patient on 1:1 if SI/HI/violence or behavioral issues emerge.  - Continue current medical workup, as you are  - Continue Clozapine 550 mg daily at 22:00  --- Obtain clozapine level prior to 22:00 tonight  - Continue Abilify 10 mg at 22:00  - Continue Benztropine 2 mg qHS for EPS  - Continues Wellbutrin 300 mg qAM  - Continue Lorazepam 0.5 mg qAM  --Obtain EKG to ensure QTc <500  -PRNs for anxiety/agitation: Ativan 1 mg q8H

## 2024-04-15 NOTE — BH CONSULTATION LIAISON ASSESSMENT NOTE - NSBHCHARTREVIEWLAB_PSY_A_CORE FT
12.9   8.98  )-----------( 224      ( 15 Apr 2024 05:30 )             38.5     04-15    143  |  106  |  15  ----------------------------<  90  3.7   |  23  |  0.81    Ca    8.8      15 Apr 2024 05:30  Phos  3.4     04-15  Mg     1.90     04-15    TPro  7.3  /  Alb  4.4  /  TBili  0.2  /  DBili  x   /  AST  23  /  ALT  39  /  AlkPhos  82  04-14    Urinalysis Basic - ( 15 Apr 2024 05:30 )    Color: x / Appearance: x / SG: x / pH: x  Gluc: 90 mg/dL / Ketone: x  / Bili: x / Urobili: x   Blood: x / Protein: x / Nitrite: x   Leuk Esterase: x / RBC: x / WBC x   Sq Epi: x / Non Sq Epi: x / Bacteria: x

## 2024-04-15 NOTE — BH CONSULTATION LIAISON ASSESSMENT NOTE - HPI (INCLUDE ILLNESS QUALITY, SEVERITY, DURATION, TIMING, CONTEXT, MODIFYING FACTORS, ASSOCIATED SIGNS AND SYMPTOMS)
Pt is a 35 y/o m, PPHx of schizophrenia, depression, anxiety, current outpt treatment with Dr. Negro x 2 months, no hx of inpt hospitalization, no hx of SI/SA/NSSIB/HI, no substance use, no pertinent PMHx, presenting with involuntary spasms and legs giving out. Admitted for neurological workup. Psychiatry consulted for medication management.     Pt A&Ox3. Pt presenting for involuntary movements of his arms and decreased sensation in legs bilaterally. 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 Was able to pass bar exam, worked as a  and now works as a . Endorses low mood and some changes in sleep and weight loss. Denies sx of acute psychosis, including AVH, ideas of reference, thought insertion / broadcasting, paranoia. Denies HI. Denies sx of prior danielle, including euphoria / irritability, decreased need for sleep, increase in risk taking behavior / productivity or pressured speech. Pt had several medication changes in last month including decrease in clozapine, addition of Abilify and decrease of lorazepam. Pt denies current SI/HI, no history of SA or NSSIB.     Pt gave verbal permission to speak with his father at bedside:  Pt dad confirms pt had prior paranoia and intermittent AVH. No AVH or paranoia for past 1.5 years while pt has been on Clozapine. Pt has been functioning well on Clozapine, though continues to have negative symptoms.     Care coordinated with pt's outpt psychiatrist Dr. Negro:  Stared tx about 2 months ago on Clozapine 600 mg qHS, Wellbutrin 450 mg qd, Lorazepam 1.5 mg qHS. Slowly started to taper lorazepam and moved it to morning. Slowly decreasing Clozapine for concern of oversedation. Planned ot decrease Wellbutrin. Pt has first break during law school, has never been admitted to inpatient psychiatry. Pt has been very sensitive to medication changes. Dr. Negro plans to have close f/u with pt when he leaves the hospital. He's okay with further decrease in Clozapine if pt remains in the hospital.  Pt is a 35 y/o m, PPHx of schizophrenia, depression, anxiety, current outpt treatment with Dr. Negro x 2 months, no hx of inpt hospitalization, no hx of SI/SA/NSSIB/HI, no substance use, no pertinent PMHx, presenting with involuntary spasms and legs giving out. Admitted for neurological workup. Psychiatry consulted for medication management.     Pt A&Ox3. Pt presenting for involuntary movements of his arms and decreased sensation in legs bilaterally. 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 Was able to pass bar exam, worked as a  and now works as a . Endorses low mood and some changes in sleep and weight loss. Denies sx of acute psychosis, including AVH, ideas of reference, thought insertion / broadcasting, paranoia. Denies HI. Denies sx of prior danielle, including euphoria / irritability, decreased need for sleep, increase in risk taking behavior / productivity or pressured speech. Pt had several medication changes in last month including decrease in clozapine, addition of Abilify and decrease of lorazepam. Pt denies current SI/HI, no history of SA or NSSIB.     Pt gave verbal permission to speak with his father at bedside:  Pt dad confirms pt had prior paranoia and intermittent AVH. No AVH or paranoia for past 1.5 years while pt has been on Clozapine. Pt has been functioning well on Clozapine, though continues to have negative symptoms.     Care coordinated with pt's outpt psychiatrist Dr. Negro:  Stared tx about 2 months ago on Clozapine 600 mg qHS, Wellbutrin 450 mg qd, Lorazepam 1.5 mg qHS. Slowly started to taper lorazepam and moved it to morning. Slowly decreasing Clozapine for concern of oversedation. Planned to decrease Wellbutrin. Pt has first break during law school, has never been admitted to inpatient psychiatry. Pt has been very sensitive to medication changes. Dr. Negro plans to have close f/u with pt when he leaves the hospital. He's okay with further decrease in Clozapine if pt remains in the hospital.

## 2024-04-16 ENCOUNTER — TRANSCRIPTION ENCOUNTER (OUTPATIENT)
Age: 34
End: 2024-04-16

## 2024-04-16 VITALS
OXYGEN SATURATION: 97 % | DIASTOLIC BLOOD PRESSURE: 69 MMHG | TEMPERATURE: 98 F | HEART RATE: 87 BPM | SYSTOLIC BLOOD PRESSURE: 111 MMHG | RESPIRATION RATE: 17 BRPM

## 2024-04-16 LAB
ANION GAP SERPL CALC-SCNC: 13 MMOL/L — SIGNIFICANT CHANGE UP (ref 7–14)
BUN SERPL-MCNC: 18 MG/DL — SIGNIFICANT CHANGE UP (ref 7–23)
CALCIUM SERPL-MCNC: 8.9 MG/DL — SIGNIFICANT CHANGE UP (ref 8.4–10.5)
CHLORIDE SERPL-SCNC: 106 MMOL/L — SIGNIFICANT CHANGE UP (ref 98–107)
CLOZAPINE SERPL-MCNC: 288 NG/ML — LOW (ref 350–600)
CO2 SERPL-SCNC: 22 MMOL/L — SIGNIFICANT CHANGE UP (ref 22–31)
CREAT SERPL-MCNC: 0.8 MG/DL — SIGNIFICANT CHANGE UP (ref 0.5–1.3)
EGFR: 120 ML/MIN/1.73M2 — SIGNIFICANT CHANGE UP
GLUCOSE SERPL-MCNC: 96 MG/DL — SIGNIFICANT CHANGE UP (ref 70–99)
HCT VFR BLD CALC: 39.2 % — SIGNIFICANT CHANGE UP (ref 39–50)
HGB BLD-MCNC: 12.9 G/DL — LOW (ref 13–17)
MAGNESIUM SERPL-MCNC: 1.9 MG/DL — SIGNIFICANT CHANGE UP (ref 1.6–2.6)
MCHC RBC-ENTMCNC: 29.9 PG — SIGNIFICANT CHANGE UP (ref 27–34)
MCHC RBC-ENTMCNC: 32.9 GM/DL — SIGNIFICANT CHANGE UP (ref 32–36)
MCV RBC AUTO: 90.7 FL — SIGNIFICANT CHANGE UP (ref 80–100)
NRBC # BLD: 0 /100 WBCS — SIGNIFICANT CHANGE UP (ref 0–0)
NRBC # FLD: 0 K/UL — SIGNIFICANT CHANGE UP (ref 0–0)
PHOSPHATE SERPL-MCNC: 3.1 MG/DL — SIGNIFICANT CHANGE UP (ref 2.5–4.5)
PLATELET # BLD AUTO: 216 K/UL — SIGNIFICANT CHANGE UP (ref 150–400)
POTASSIUM SERPL-MCNC: 4.1 MMOL/L — SIGNIFICANT CHANGE UP (ref 3.5–5.3)
POTASSIUM SERPL-SCNC: 4.1 MMOL/L — SIGNIFICANT CHANGE UP (ref 3.5–5.3)
RBC # BLD: 4.32 M/UL — SIGNIFICANT CHANGE UP (ref 4.2–5.8)
RBC # FLD: 12.2 % — SIGNIFICANT CHANGE UP (ref 10.3–14.5)
SODIUM SERPL-SCNC: 141 MMOL/L — SIGNIFICANT CHANGE UP (ref 135–145)
WBC # BLD: 9.21 K/UL — SIGNIFICANT CHANGE UP (ref 3.8–10.5)
WBC # FLD AUTO: 9.21 K/UL — SIGNIFICANT CHANGE UP (ref 3.8–10.5)

## 2024-04-16 PROCEDURE — 99232 SBSQ HOSP IP/OBS MODERATE 35: CPT

## 2024-04-16 RX ORDER — BUPROPION HYDROCHLORIDE 150 MG/1
1 TABLET, EXTENDED RELEASE ORAL
Qty: 30 | Refills: 0
Start: 2024-04-16 | End: 2024-06-20

## 2024-04-16 RX ORDER — BUPROPION HYDROCHLORIDE 150 MG/1
1 TABLET, EXTENDED RELEASE ORAL
Qty: 30 | Refills: 0
Start: 2024-04-16 | End: 2024-05-15

## 2024-04-16 RX ADMIN — Medication 0.5 MILLIGRAM(S): at 11:08

## 2024-04-16 RX ADMIN — BUPROPION HYDROCHLORIDE 300 MILLIGRAM(S): 150 TABLET, EXTENDED RELEASE ORAL at 09:52

## 2024-04-16 RX ADMIN — CHLORHEXIDINE GLUCONATE 1 APPLICATION(S): 213 SOLUTION TOPICAL at 06:33

## 2024-04-16 NOTE — DIETITIAN INITIAL EVALUATION ADULT - ETIOLOGY
physiological cause (Pt with hx of anxiety, depression)  inadequate energy intake related to physiological cause (hx of anxiety, depression)

## 2024-04-16 NOTE — DISCHARGE NOTE NURSING/CASE MANAGEMENT/SOCIAL WORK - PATIENT PORTAL LINK FT
You can access the FollowMyHealth Patient Portal offered by [facility] by registering at the following website: [email or web address]. By joining [facility]’s FollowMyHealth portal, you will also be able to view your health information using other applications (apps) compatible with our system.

## 2024-04-16 NOTE — BH CONSULTATION LIAISON PROGRESS NOTE - NSBHCHARTREVIEWLAB_PSY_A_CORE FT
12.9   9.21  )-----------( 216      ( 16 Apr 2024 06:41 )             39.2     04-16    141  |  106  |  18  ----------------------------<  96  4.1   |  22  |  0.80    Ca    8.9      16 Apr 2024 06:41  Phos  3.1     04-16  Mg     1.90     04-16    TPro  7.3  /  Alb  4.4  /  TBili  0.2  /  DBili  x   /  AST  23  /  ALT  39  /  AlkPhos  82  04-14    Urinalysis Basic - ( 16 Apr 2024 06:41 )    Color: x / Appearance: x / SG: x / pH: x  Gluc: 96 mg/dL / Ketone: x  / Bili: x / Urobili: x   Blood: x / Protein: x / Nitrite: x   Leuk Esterase: x / RBC: x / WBC x   Sq Epi: x / Non Sq Epi: x / Bacteria: x

## 2024-04-16 NOTE — DISCHARGE NOTE PROVIDER - NSDCCPTREATMENT_GEN_ALL_CORE_FT
PRINCIPAL PROCEDURE  Procedure: EEG awake and asleep  Findings and Treatment: EEG Classification / Summary:  Abnormal EEG in the awake, drowsy, and asleep states.   Mild diffuse slowing.  Rare bilateral independent frontotemporal focal slowing.  No epileptiform abnormalities are captured.   Clinical Impression:  Mild diffuse cerebral dysfunction is nonspecific in etiology.   Mild bilateral frontotemporal focal cerebral dysfunction can be structural or functional in etiology.

## 2024-04-16 NOTE — BH CONSULTATION LIAISON PROGRESS NOTE - NSBHASSESSMENTFT_PSY_ALL_CORE
Pt is a 33 y/o m, PPHx of schizophrenia, depression, anxiety, current outpt treatment with Dr. Negro x 2 months, no hx of inpt hospitalization, no hx of SI/SA/NSSIB/HI, no substance use, no pertinent PMHx, presenting with involuntary spasms and legs giving out. Admitted for neurological workup. Psychiatry consulted for medication management.     4/15: On assessment, pt does not present with any acute positive symptoms of psychosis. Endorses periods of depression with wt loss. Pt has more prominent negative symptoms. No SI/HI. Care coordinated with pt's father and outpt psychiatrist. Continue on current medication regime.   4/16: Persistent anxiety, paucity of speech and constricted affect. No positive symptoms of psychosis, does not appear internally preoccupied. Linear thought process. Denies SI/HI. Clozapine level 288, previous level 360 on 3/30/24. Care coordinated with pt's father at bedside. Pt cleared from psychiatric perspective, will follow up with Dr. Negro outpt once medically cleared.    Recommendations  - No 1:1 CO necessary, as patient is not at increased risk of SI/HI/violence. Monitor behaviors and have a low threshold for putting patient on 1:1 if SI/HI/violence or behavioral issues emerge.  - Continue current medical workup, as you are  - Continue Clozapine 550 mg daily at 22:00  --- Clozapine level 288, previous level 360 on 3/30/24.  - Continue Abilify 10 mg at 22:00  - Continue Benztropine 2 mg qHS for EPS  - Continues Wellbutrin 300 mg qAM  - Continue Lorazepam 0.5 mg qAM  --Obtain EKG to ensure QTc <500  -PRNs for anxiety/agitation: Ativan 1 mg q8H  --AVOID PRN antipsychotics

## 2024-04-16 NOTE — PROGRESS NOTE ADULT - ASSESSMENT
35 y/o M with history of mood disorder with depression and anxiety p/w  involuntary spasms and legs giving out. 
35 y/o M with history of mood disorder with depression and anxiety p/w  involuntary spasms and legs giving out.

## 2024-04-16 NOTE — BH CONSULTATION LIAISON PROGRESS NOTE - NSBHFUPINTERVALHXFT_PSY_A_CORE
Chart reviewed. No PRNs required or requested. Pt endorses feeling anxious for the past few days due to the uncontrolled movements. Denies low mood or passive/active SI/HI. No AVH, paranoia or ideas of reference. Pt states that clozapine has been helpful for his depression/anxiety, helping his thoughts be clear. He denies feeling oversedated on the medication. Pt in agreement on eventually decreasing the dose of clozapine.     Care coordinated with pt's father at bedside.   Pt's previous clozapine level was 360 on 3/30/24. Discussed plan for pt to have close f/u with Dr. Negro on discharge. Pending medical clearance.

## 2024-04-16 NOTE — PROGRESS NOTE ADULT - PROBLEM SELECTOR PLAN 1
- Reviewed neurology consult.  Possibly partial seizures  - EEG is negative for Sz activity. cleared for dc home. f/u w neurology re clozapine dosing, since level was low
- Reviewed neurology consult.  Possibly partial seizures  - will check 24h VEEG

## 2024-04-16 NOTE — DIETITIAN INITIAL EVALUATION ADULT - OTHER INFO
34M PMHx of mood disorder w/ depression and anxiety p/w involuntary spasms and legs giving out.       At time of visit, Pt awake, alert but slow speech. Pt with good appetite/PO intake reported. Pt eats ~90% of meals reported as well. No report of chewing or swallowing difficulty; no report of nausea, vomiting or diarrhea @ this time. But Pt C/O constipation "could be related to medication" ->> Rec to add bowel regimen per MD discretion. Better food options discussed.     Pt's height: 73", Pt's body weight: ~137# "stable for a year of so". Pt with unintended weight loss ~15# "over the course of years". Food preferences discussed with Pt. Rec to add PO supplement: Ensure Plus HP - 2x daily to diet rx. Kitchen to provide Magic Cup - 2x daily. RD answered Pt's concerns related to diet. RD remains available, Pt made aware.     Pt 35 yo male with PMHx of mood disorder w/ depression, anxiety p/w involuntary spasms and legs giving out - per chart review.       At time of visit, Pt awake, alert but slow speech. Pt with good appetite/PO intake reported. Pt eats ~90% of meals reported as well. No report of chewing or swallowing difficulty; no report of vomiting or diarrhea @ this time. But Pt C/O constipation "could be related to medication" ->> Rec to add bowel regimen per MD discretion. Better food options discussed.     Pt's height: ~73", Pt's body weight: ~137# "stable for a year of so". Pt with unintended weight loss ~15# "over the course of years". Food preferences discussed with Pt. Rec to add PO supplement: Ensure Plus HP - 2x daily to diet rx. Kitchen to provide Magic Cup - 2x daily. RD answered Pt's concerns related to diet. RD remains available, Pt made aware.     Pt 35 yo male with PMHx of mood disorder w/ depression, anxiety p/w involuntary spasms and legs giving out - per chart review.       At time of visit, Pt awake, alert but slow speech. Pt with good appetite/PO intake reported. Pt eats ~90% of meals reported as well. No report of chewing or swallowing difficulty; no report of vomiting or diarrhea @ this time. But Pt C/O constipation "could be related to medication" ->> Rec to add bowel regimen per MD discretion. Better food options discussed with Pt.     Pt's height: ~73", Pt's body weight: ~137# "stable for a year of so". Pt with unintended weight loss ~15# "over the course of years". Food preferences discussed with Pt. Rec to add PO supplement: Ensure Plus HP - 2x daily to diet rx. Kitchen to provide Magic Cup - 2x daily. RD answered Pt's concerns related to diet. RD remains available, Pt made aware.

## 2024-04-16 NOTE — EEG REPORT - NS EEG TEXT BOX
LISA ROWE [id number]     Study Date: 4/15/24 18:26 - 4/16/24 08:00  Duration: 13 hr 33 min  --------------------------------------------------------------------------------------------------  History:  CC/ HPI Patient is a 34y old  Male who presents with a chief complaint of spasms (15 Apr 2024 19:45)    MEDICATIONS  (STANDING):  ARIPiprazole 10 milliGRAM(s) Oral <User Schedule>  benztropine 2 milliGRAM(s) Oral at bedtime  buPROPion XL (24-Hour) . 300 milliGRAM(s) Oral <User Schedule>  chlorhexidine 2% Cloths 1 Application(s) Topical <User Schedule>  cloZAPine 550 milliGRAM(s) Oral <User Schedule>  influenza   Vaccine 0.5 milliLiter(s) IntraMuscular once  LORazepam     Tablet 0.5 milliGRAM(s) Oral daily    --------------------------------------------------------------------------------------------------  Study Interpretation:    [[[Abbreviation Key:  PDR=alpha rhythm/posterior dominant rhythm. A-P=anterior posterior.  Amplitude: ‘very low’:<20; ‘low’:20-49; ‘medium’:; ‘high’:>150uV.  Persistence for periodic/rhythmic patterns (% of epoch) ‘rare’:<1%; ‘occasional’:1-10%; ‘frequent’:10-50%; ‘abundant’:50-90%; ‘continuous’:>90%.  Persistence for sporadic discharges: ‘rare’:<1/hr; ‘occasional’:1/min-1/hr; ‘frequent’:>1/min; ‘abundant’:>1/10 sec.  RPP=rhythmic and periodic patterns; GRDA=generalized rhythmic delta activity; FIRDA=frontal intermittent GRDA; LRDA=lateralized rhythmic delta activity; TIRDA=temporal intermittent rhythmic delta activity;  LPD=PLED=lateralized periodic discharges; GPD=generalized periodic discharges; BIPDs =bilateral independent periodic discharges; Mf=multifocal; SIRPDs=stimulus induced rhythmic, periodic, or ictal appearing discharges; BIRDs=brief potentially ictal rhythmic discharges >4 Hz, lasting .5-10s; PFA (paroxysmal bursts >13 Hz or =8 Hz <10s).  Modifiers: +F=with fast component; +S=with spike component; +R=with rhythmic component.  S-B=burst suppression pattern.  Max=maximal. N1-drowsy; N2-stage II sleep; N3-slow wave sleep. SSS/BETS=small sharp spikes/benign epileptiform transients of sleep. HV=hyperventilation; PS=photic stimulation]]]    Daily EEG Visual Analysis    FINDINGS:      Background:  Continuity: Continuous  Symmetry: Symmetric  Posterior dominant rhythm (PDR): Poorly formed, 8 Hz, reactive to eye closure. Intermittent symmetric low-amplitude frontal beta in wakefulness.  Voltage: Normal  Anterior-Posterior Gradient: Present  Other background findings: Intermittent diffuse polymorphic delta and theta slowing in wakefulness  Breach: Absent    Background Slowing:  Generalized slowing: As above  Focal slowing: None    State Changes:   Drowsiness is characterized by slowing of the background activity.  Rudimentary stage 2 sleep is characterized by rudimentary symmetric sleep spindles.     Interictal Findings:  None    Electrographic and Electroclinical seizures:  None    Other Clinical Events:  None    Activation Procedures:   Hyperventilation is not performed.    Photic stimulation is not performed.    Artifacts:  Intermittent myogenic and movement artifacts are present.    EKG:  Single-lead EKG shows regular rhythm. Often limited by artifact.    EEG Classification / Summary:  Abnormal EEG in the awake, drowsy, and asleep states.   Mild diffuse slowing.  No focal or epileptiform abnormalities are captured.     Clinical Impression:  Mild diffuse cerebral dysfunction is nonspecific in etiology.           -------------------------------------------------------------------------------------------------------    Sobia Vargas MD  Attending Physician, [facility] Epilepsy [place]    -------------------------------------------------------------------------------------------------------    To reach EEG technologist:  Please use the pager number for the appropriate hospital or contact the .  At [facility] - Pager #: [phone]    To reach EEG-reading physician:  [facility] EEG Reading Room Phone #: [phone]  Epilepsy Answering Service after 5PM and before 8:30AM: Phone #: [phone]     LISA ROWE [id number]     Study Date: 4/15/24 12:37 - 4/16/24 08:00  Duration: 19 hr 6 min  --------------------------------------------------------------------------------------------------  History:  CC/ HPI Patient is a 34y old  Male who presents with a chief complaint of spasms (15 Apr 2024 19:45)    MEDICATIONS  (STANDING):  ARIPiprazole 10 milliGRAM(s) Oral <User Schedule>  benztropine 2 milliGRAM(s) Oral at bedtime  buPROPion XL (24-Hour) . 300 milliGRAM(s) Oral <User Schedule>  chlorhexidine 2% Cloths 1 Application(s) Topical <User Schedule>  cloZAPine 550 milliGRAM(s) Oral <User Schedule>  influenza   Vaccine 0.5 milliLiter(s) IntraMuscular once  LORazepam     Tablet 0.5 milliGRAM(s) Oral daily    --------------------------------------------------------------------------------------------------  Study Interpretation:    [[[Abbreviation Key:  PDR=alpha rhythm/posterior dominant rhythm. A-P=anterior posterior.  Amplitude: ‘very low’:<20; ‘low’:20-49; ‘medium’:; ‘high’:>150uV.  Persistence for periodic/rhythmic patterns (% of epoch) ‘rare’:<1%; ‘occasional’:1-10%; ‘frequent’:10-50%; ‘abundant’:50-90%; ‘continuous’:>90%.  Persistence for sporadic discharges: ‘rare’:<1/hr; ‘occasional’:1/min-1/hr; ‘frequent’:>1/min; ‘abundant’:>1/10 sec.  RPP=rhythmic and periodic patterns; GRDA=generalized rhythmic delta activity; FIRDA=frontal intermittent GRDA; LRDA=lateralized rhythmic delta activity; TIRDA=temporal intermittent rhythmic delta activity;  LPD=PLED=lateralized periodic discharges; GPD=generalized periodic discharges; BIPDs =bilateral independent periodic discharges; Mf=multifocal; SIRPDs=stimulus induced rhythmic, periodic, or ictal appearing discharges; BIRDs=brief potentially ictal rhythmic discharges >4 Hz, lasting .5-10s; PFA (paroxysmal bursts >13 Hz or =8 Hz <10s).  Modifiers: +F=with fast component; +S=with spike component; +R=with rhythmic component.  S-B=burst suppression pattern.  Max=maximal. N1-drowsy; N2-stage II sleep; N3-slow wave sleep. SSS/BETS=small sharp spikes/benign epileptiform transients of sleep. HV=hyperventilation; PS=photic stimulation]]]    Daily EEG Visual Analysis    FINDINGS:      Background:  Continuity: Continuous  Symmetry: Symmetric  Posterior dominant rhythm (PDR): Poorly formed, 8 Hz, reactive to eye closure. Intermittent symmetric low-amplitude frontal beta in wakefulness.  Voltage: Normal  Anterior-Posterior Gradient: Present  Other background findings: Intermittent diffuse polymorphic delta and theta slowing in wakefulness  Breach: Absent    Background Slowing:  Generalized slowing: As above  Focal slowing: Rare left frontotemporal focal polymorphic delta slowing    State Changes:   Drowsiness is characterized by slowing of the background activity.  Rudimentary stage 2 sleep is characterized by rudimentary symmetric sleep spindles.     Interictal Findings:  None    Electrographic and Electroclinical seizures:  None    Other Clinical Events:  None    Activation Procedures:   Hyperventilation is not performed.    Photic stimulation is performed and does not elicit any abnormalities.    Artifacts:  Intermittent myogenic and movement artifacts are present.    EKG:  Single-lead EKG shows regular rhythm. Often limited by artifact.    EEG Classification / Summary:  Abnormal EEG in the awake, drowsy, and asleep states.   Mild diffuse slowing.  Rare left frontotemporal focal slowing.  No epileptiform abnormalities are captured.     Clinical Impression:  Mild diffuse cerebral dysfunction is nonspecific in etiology.   Mild left frontotemporal focal cerebral dysfunction can be structural or functional in etiology.           -------------------------------------------------------------------------------------------------------    Sobia Vargas MD  Attending Physician, [facility]    -------------------------------------------------------------------------------------------------------    To reach EEG technologist:  Please use the pager number for the appropriate hospital or contact the .  At [facility] - Pager #: [phone]    To reach EEG-reading physician:  [facility] EEG Reading Room Phone #: [phone]  Epilepsy Answering Service after 5PM and before 8:30AM: Phone #: [phone]     LISA ROWE [id number]     Study Date: 4/15/24 12:37 - 4/16/24 11:33  Duration: 22 hr 37 min  --------------------------------------------------------------------------------------------------  History:  CC/ HPI Patient is a 34y old  Male who presents with a chief complaint of spasms (15 Apr 2024 19:45)    MEDICATIONS  (STANDING):  ARIPiprazole 10 milliGRAM(s) Oral <User Schedule>  benztropine 2 milliGRAM(s) Oral at bedtime  buPROPion XL (24-Hour) . 300 milliGRAM(s) Oral <User Schedule>  chlorhexidine 2% Cloths 1 Application(s) Topical <User Schedule>  cloZAPine 550 milliGRAM(s) Oral <User Schedule>  influenza   Vaccine 0.5 milliLiter(s) IntraMuscular once  LORazepam     Tablet 0.5 milliGRAM(s) Oral daily    --------------------------------------------------------------------------------------------------  Study Interpretation:    [[[Abbreviation Key:  PDR=alpha rhythm/posterior dominant rhythm. A-P=anterior posterior.  Amplitude: ‘very low’:<20; ‘low’:20-49; ‘medium’:; ‘high’:>150uV.  Persistence for periodic/rhythmic patterns (% of epoch) ‘rare’:<1%; ‘occasional’:1-10%; ‘frequent’:10-50%; ‘abundant’:50-90%; ‘continuous’:>90%.  Persistence for sporadic discharges: ‘rare’:<1/hr; ‘occasional’:1/min-1/hr; ‘frequent’:>1/min; ‘abundant’:>1/10 sec.  RPP=rhythmic and periodic patterns; GRDA=generalized rhythmic delta activity; FIRDA=frontal intermittent GRDA; LRDA=lateralized rhythmic delta activity; TIRDA=temporal intermittent rhythmic delta activity;  LPD=PLED=lateralized periodic discharges; GPD=generalized periodic discharges; BIPDs =bilateral independent periodic discharges; Mf=multifocal; SIRPDs=stimulus induced rhythmic, periodic, or ictal appearing discharges; BIRDs=brief potentially ictal rhythmic discharges >4 Hz, lasting .5-10s; PFA (paroxysmal bursts >13 Hz or =8 Hz <10s).  Modifiers: +F=with fast component; +S=with spike component; +R=with rhythmic component.  S-B=burst suppression pattern.  Max=maximal. N1-drowsy; N2-stage II sleep; N3-slow wave sleep. SSS/BETS=small sharp spikes/benign epileptiform transients of sleep. HV=hyperventilation; PS=photic stimulation]]]    Daily EEG Visual Analysis    FINDINGS:      Background:  Continuity: Continuous  Symmetry: Symmetric  Posterior dominant rhythm (PDR): Poorly formed, 8 Hz, reactive to eye closure. Intermittent symmetric low-amplitude frontal beta in wakefulness.  Voltage: Normal  Anterior-Posterior Gradient: Present  Other background findings: Intermittent diffuse polymorphic delta and theta slowing in wakefulness  Breach: Absent    Background Slowing:  Generalized slowing: As above  Focal slowing: Rare bilateral independent frontotemporal focal polymorphic delta slowing    State Changes:   Drowsiness is characterized by slowing of the background activity.  Rudimentary stage 2 sleep is characterized by rudimentary symmetric sleep spindles.     Interictal Findings:  None    Electrographic and Electroclinical seizures:  None    Other Clinical Events:  None    Activation Procedures:   Hyperventilation is not performed.    Photic stimulation is performed and does not elicit any abnormalities.    Artifacts:  Intermittent myogenic and movement artifacts are present.    EKG:  Single-lead EKG shows regular rhythm. Often limited by artifact.    EEG Classification / Summary:  Abnormal EEG in the awake, drowsy, and asleep states.   Mild diffuse slowing.  Rare bilateral independent frontotemporal focal slowing.  No epileptiform abnormalities are captured.     Clinical Impression:  Mild diffuse cerebral dysfunction is nonspecific in etiology.   Mild bilateral frontotemporal focal cerebral dysfunction can be structural or functional in etiology.           -------------------------------------------------------------------------------------------------------    Sobia Vargas MD  Attending Physician, [facility] Epilepsy [place]    -------------------------------------------------------------------------------------------------------    To reach EEG technologist:  Please use the pager number for the appropriate hospital or contact the .  At [facility] - Pager #: [phone]    To reach EEG-reading physician:  [facility] EEG Reading Room Phone #: [phone]  Epilepsy Answering Service after 5PM and before 8:30AM: Phone #: [phone]

## 2024-04-16 NOTE — DISCHARGE NOTE PROVIDER - CARE PROVIDER_API CALL
Miguel Negro  Psychiatry  [address] [place] [address], [address] [address]  Phone: [phone]  Fax: [phone]  Follow Up Time:     Rajeev Mccarthy  Phone: (   )    -  Fax: (   )    -  Established Patient  Follow Up Time:     Esthela Melara  NP in Family Health  [address] [place] [address], [facility] [address]  [place] [address]  Phone: [phone]  Fax: [phone]  Follow Up Time:

## 2024-04-16 NOTE — DISCHARGE NOTE PROVIDER - PROVIDER TOKENS
CC:  [employer] is here today for Foot (Lt foot, big toe)       Medications: medications verified and updated    Refills needed today? NO    Latex allergy or sensitivity: No known latex allergy     Patient would like communication of their results via:  Karen Clements    Patient's current [facility] status: Active. Advanced directives: on file    Care Teams Verified: No Changes    Depression Screen: yes negative    Tobacco history: verified    Health Maintenance Due   Topic Date Due   â¢ Abdominal Aortic Aneurysm (AAA) Screening  Never done   â¢ Pneumococcal Vaccine 65+ (1 - PCV) Never done       Patient is due for topics as listed above but is not proceeding with Immunization(s) Pneumococcal and Abdominal Aortic Aneurysm (AAA) screening at this time. PROVIDER:[TOKEN:[[id number]]],FREE:[LAST:[Tabibzadeh],FIRST:[Shokri],PHONE:[(   )    -],FAX:[(   )    -],ESTABLISHEDPATIENT:[T]],PROVIDER:[TOKEN:[[id number]]]

## 2024-04-16 NOTE — PROGRESS NOTE ADULT - SUBJECTIVE AND OBJECTIVE BOX
Patient is a 34y old  Male who presents with a chief complaint of spasms (16 Apr 2024 16:10)      SUBJECTIVE / OVERNIGHT EVENTS: EEG is negative for Sz activity. cleared for dc home. f/u w neurology re clozapine dosing, since level was low    MEDICATIONS  (STANDING):  ARIPiprazole 10 milliGRAM(s) Oral <User Schedule>  benztropine 2 milliGRAM(s) Oral at bedtime  buPROPion XL (24-Hour) . 300 milliGRAM(s) Oral <User Schedule>  chlorhexidine 2% Cloths 1 Application(s) Topical <User Schedule>  cloZAPine 550 milliGRAM(s) Oral <User Schedule>  influenza   Vaccine 0.5 milliLiter(s) IntraMuscular once  LORazepam     Tablet 0.5 milliGRAM(s) Oral daily    MEDICATIONS  (PRN):      Vital Signs Last 24 Hrs  T(F): 97.8 (04-16-24 @ 16:16), Max: 98 (04-15-24 @ 21:15)  HR: 87 (04-16-24 @ 16:16) (87 - 92)  BP: 111/69 (04-16-24 @ 16:16) (108/64 - 117/61)  RR: 17 (04-16-24 @ 16:16) (16 - 18)  SpO2: 97% (04-16-24 @ 16:16) (96% - 100%)  Telemetry:   CAPILLARY BLOOD GLUCOSE        I&O's Summary      PHYSICAL EXAM:  GENERAL: NAD, well-developed  HEAD:  Atraumatic, Normocephalic  EYES: EOMI, PERRLA, conjunctiva and sclera clear  NECK: Supple, No JVD  CHEST/LUNG: Clear to auscultation bilaterally; No wheeze  HEART: Regular rate and rhythm; No murmurs, rubs, or gallops  ABDOMEN: Soft, Nontender, Nondistended; Bowel sounds present  EXTREMITIES:  2+ Peripheral Pulses, No clubbing, cyanosis, or edema  PSYCH: AAOx3  NEUROLOGY: non-focal  SKIN: No rashes or lesions    LABS:                        12.9   9.21  )-----------( 216      ( 16 Apr 2024 06:41 )             39.2     04-16    141  |  106  |  18  ----------------------------<  96  4.1   |  22  |  0.80    Ca    8.9      16 Apr 2024 06:41  Phos  3.1     04-16  Mg     1.90     04-16            Urinalysis Basic - ( 16 Apr 2024 06:41 )    Color: x / Appearance: x / SG: x / pH: x  Gluc: 96 mg/dL / Ketone: x  / Bili: x / Urobili: x   Blood: x / Protein: x / Nitrite: x   Leuk Esterase: x / RBC: x / WBC x   Sq Epi: x / Non Sq Epi: x / Bacteria: x        RADIOLOGY & ADDITIONAL TESTS:    Imaging Personally Reviewed:    Consultant(s) Notes Reviewed:      Care Discussed with Consultants/Other Providers:

## 2024-04-16 NOTE — DISCHARGE NOTE PROVIDER - CARE PROVIDERS DIRECT ADDRESSES
,DirectAddress_Unknown,DirectAddress_Unknown,[email or web address]

## 2024-04-16 NOTE — DIETITIAN INITIAL EVALUATION ADULT - ADD RECOMMEND
1. Encourage & assist Pt with meals; Monitor PO diet tolerance;   2. Honor food preferences;   3. Kitchen to provide Magic Cup (290kcal, 9gm Protein) - 2x daily;   4. Add Bowel regimen per MD discretion;   5. Monitor labs, hydration status;  1. Encourage & assist Pt with meals; Monitor PO diet tolerance;   2. Honor food preferences;   3. Kitchen to provide Magic Cup (290kcal, 9gm Protein) - 2x daily;   4. Add Bowel regimen per MD discretion;   5. Add Multivitamins 1 tab daily for micronutrient coverage;   6. Monitor labs, hydration status;

## 2024-04-16 NOTE — DIETITIAN INITIAL EVALUATION ADULT - CALCULATED TO (G/KG)
87.22
Chronic Diarrhea    WHAT YOU NEED TO KNOW:    Diarrhea is chronic when it lasts more than 4 weeks. You may have 3 or more episodes of diarrhea each day.     DISCHARGE INSTRUCTIONS:    Return to the emergency department if:   •Your skin, mouth, and tongue are dry, and you feel very thirsty.      •You have blood or pus in your bowel movement.      •You have trouble eating, drinking, or keeping food down.      •You have severe abdominal pain.       •You feel lightheaded, weak, or you faint.       •Your heart beats faster than normal or you have trouble breathing.       •You are confused or cannot think clearly.      Contact your healthcare provider if:   •You have a fever.      •You have new symptoms.      •Your symptoms do not improve, or they get worse.       •You have questions or concerns about your condition or care.      Medicines:   •You may be given medicine to slow your diarrhea, or treat an infection. You may also be given medicines to decrease inflammation in your intestines.       •Take your medicine as directed. Contact your healthcare provider if you think your medicine is not helping or if you have side effects. Tell him of her if you are allergic to any medicine. Keep a list of the medicines, vitamins, and herbs you take. Include the amounts, and when and why you take them. Bring the list or the pill bottles to follow-up visits. Carry your medicine list with you in case of an emergency.      Follow up with your healthcare provider as directed: Write down your questions so you remember to ask them during your visits.     Self-care:   •Drink more liquids to replace body fluids lost through diarrhea. You may also need to drink an oral rehydration solution (ORS). An ORS has the right amounts of sugar, salt, and minerals in water to replace body fluids. ORS can be found at most grocery stores or pharmacies. Ask how much liquid to drink each day and which liquids are best for you. Do not drink liquids with caffeine or alcohol. These can increase your risk for dehydration.       •Do not drink or eat foods that may make your symptoms worse. These include milk and dairy products, greasy and fatty foods, spicy foods, caffeine, and alcohol. Keep a food diary to see if your symptoms are caused by certain foods. Bring this to your follow-up visits.      •Eat foods that are easy to digest. These include bananas, boiled potatoes, cooked carrots, cooked chicken, plain rice, and toast. You can also try yogurt and applesauce.       •Wash your hands often. Germs on your hands can get into your mouth and cause diarrhea. Use soap and water. Use an alcohol-based hand rub if soap and water are not available. Wash your hands after you use the bathroom, change a child's diaper, or sneeze. Wash your hands before you prepare or eat food.    Hematuria    WHAT YOU NEED TO KNOW:    Hematuria is blood in your urine. Your urine may be bright red to dark brown.    DISCHARGE INSTRUCTIONS:    Return to the emergency department if:   •You have blood in your urine after a new injury, such as a fall.      •You have severe back or side pain that does not go away with treatment.      Call your doctor if:   •You are urinating very small amounts or not at all.      •You feel like you cannot empty your bladder.      •You have a fever that gets worse or does not go away with treatment.      •You cannot keep liquids or medicines down.      •Your urine gets darker, even after you drink extra liquids.      •You have questions or concerns about your condition, treatment, or care.      Drink liquids as directed: You may need to drink extra liquids to help flush the blood from your body through your urine. Water is the best liquid to drink. Ask how much liquid to drink each day and which liquids are best for you.    Follow up with your doctor as directed: Write down your questions so you remember to ask them during your visits.

## 2024-04-16 NOTE — CHART NOTE - NSCHARTNOTEFT_GEN_A_CORE
EEG with mild diffuse slowing which is nonspecific in etiology. No epileptiform discharges nor seizures noted. Movements not due to seizure activity, consider iatrogenic. Appreciated  recommendations.  Neurology will sign off. Please call [phone] with any questions. Discussed with attending. EEG:  Study Date: 4/15/24 12:37 - 4/16/24 11:33  Duration: 22 hr 37 min    EEG Classification / Summary:  Abnormal EEG in the awake, drowsy, and asleep states.   Mild diffuse slowing.  Rare bilateral independent frontotemporal focal slowing.  No epileptiform abnormalities are captured.     Clinical Impression:  Mild diffuse cerebral dysfunction is nonspecific in etiology.   Mild bilateral frontotemporal focal cerebral dysfunction can be structural or functional in etiology.       **Incomplete** EEG:  Study Date: 4/15/24 12:37 - 4/16/24 11:33  Duration: 22 hr 37 min    EEG Classification / Summary:  Abnormal EEG in the awake, drowsy, and asleep states.   Mild diffuse slowing.  Rare bilateral independent frontotemporal focal slowing.  No epileptiform abnormalities are captured.     Clinical Impression:  Mild diffuse cerebral dysfunction is nonspecific in etiology.   Mild bilateral frontotemporal focal cerebral dysfunction can be structural or functional in etiology.     Results reviewed with attending and primary team. Patient has been improving, without persistence of abnormal movements.     Recommendations:   - Outpatient MRI brain w/wo  - Patient can follow up with general neurology at [address] [place] [address] after discharge. Please instruct the patient to call [phone] to schedule this appointment.     Discussed with primary team.   [place] neurology at [phone] with any questions.

## 2024-04-16 NOTE — DISCHARGE NOTE PROVIDER - HOSPITAL COURSE
34M PMHx of mood disorder w/ depression and anxiety p/w involuntary spasms and legs giving out.  Neurology was consulted- EEG was negative for seizures, outpatient follow-up. Psych was consulted - no adjustment to medications, will follow-up with outpatient psych Dr. Negro    Case discussed with Dr. Cesar on 4/16/24. Patient is medically stable and optimized for discharge as per attending. Discharge plan reviewed with patient and/or family.    34M PMHx of mood disorder w/ depression and anxiety p/w involuntary spasms and legs giving out.  Neurology was consulted- EEG was negative for seizures, however abnormalities were noted- discussed with Neurology, recommending outpatient MRI, Neurology follow-up. Psych was consulted - no adjustment to medications, will follow-up with outpatient psych Dr. Negro    Case discussed with Dr. Cesar on 4/16/24. Patient is medically stable and optimized for discharge as per attending. Discharge plan reviewed with patient and/or family.

## 2024-04-16 NOTE — BH CONSULTATION LIAISON PROGRESS NOTE - NSBHATTESTCOMMENTATTENDFT_PSY_A_CORE
Chart reviewed. Pt seen with trainee. Calm, receiving EEG. Father present. Denied overt psych concerns. Agree with above assessment/recs. Will continue to follow

## 2024-04-16 NOTE — DISCHARGE NOTE PROVIDER - NSDCMRMEDTOKEN_GEN_ALL_CORE_FT
Abilify 10 mg oral tablet: 1 tab(s) orally once a day (at bedtime)  Ativan 0.5 mg oral tablet: 1 tab(s) orally once a day in the morning  benztropine 2 mg oral tablet: 1 tab(s) orally once a day (at bedtime)  cloZAPine 50 mg oral tablet: 550 milligram(s) orally once a day (at bedtime)   Abilify 10 mg oral tablet: 1 tab(s) orally once a day (at bedtime)  Ativan 0.5 mg oral tablet: 1 tab(s) orally once a day in the morning  benztropine 2 mg oral tablet: 1 tab(s) orally once a day (at bedtime)  buPROPion 300 mg/24 hours (XL) oral tablet, extended release: 1 tab(s) orally once a day  cloZAPine 50 mg oral tablet: 550 milligram(s) orally once a day (at bedtime)

## 2024-04-16 NOTE — DISCHARGE NOTE PROVIDER - NSDCCPCAREPLAN_GEN_ALL_CORE_FT
PRINCIPAL DISCHARGE DIAGNOSIS  Diagnosis: Episode of shaking  Assessment and Plan of Treatment: You were having episodes of shaking, you were seen by Neurology who recommended EEG to check for seizure which was neagitve, please follow-up with Neurology as an outpatient      SECONDARY DISCHARGE DIAGNOSES  Diagnosis: Depression with anxiety  Assessment and Plan of Treatment: Please continue to take your medications as prescribed, please follow-up with your Psychiatrist Dr. Negro     PRINCIPAL DISCHARGE DIAGNOSIS  Diagnosis: Episode of shaking  Assessment and Plan of Treatment: You were having episodes of shaking, you were seen by Neurology who recommended EEG to check for seizure which was negative, however there were other abnormalities noted on your EEG, please follow-up with Neurology as an outpatient to have MRI done      SECONDARY DISCHARGE DIAGNOSES  Diagnosis: Depression with anxiety  Assessment and Plan of Treatment: Please continue to take your medications as prescribed, please follow-up with your Psychiatrist Dr. Negro

## 2024-04-16 NOTE — BH CONSULTATION LIAISON PROGRESS NOTE - NSBHCHARTREVIEWVS_PSY_A_CORE FT
Vital Signs Last 24 Hrs  T(C): 36.5 (16 Apr 2024 11:55), Max: 36.7 (15 Apr 2024 14:00)  T(F): 97.7 (16 Apr 2024 11:55), Max: 98.1 (15 Apr 2024 14:00)  HR: 92 (16 Apr 2024 11:55) (90 - 93)  BP: 108/64 (16 Apr 2024 11:55) (108/64 - 117/61)  BP(mean): --  RR: 16 (16 Apr 2024 11:55) (16 - 18)  SpO2: 96% (16 Apr 2024 11:55) (96% - 100%)    Parameters below as of 16 Apr 2024 11:55  Patient On (Oxygen Delivery Method): room air

## 2024-04-18 LAB
A-TOCOPHEROL VIT E SERPL-MCNC: 8.5 MG/L — SIGNIFICANT CHANGE UP (ref 5.9–19.4)
BETA+GAMMA TOCOPHEROL SERPL-MCNC: 0.7 MG/L — SIGNIFICANT CHANGE UP (ref 0.7–4.9)

## 2024-04-19 LAB
A-TOCOPHEROL VIT E SERPL-MCNC: 8.7 MG/L — SIGNIFICANT CHANGE UP (ref 5.9–19.4)
BETA+GAMMA TOCOPHEROL SERPL-MCNC: 0.6 MG/L — LOW (ref 0.7–4.9)

## 2024-04-19 RX ORDER — ARIPIPRAZOLE 15 MG/1
1 TABLET ORAL
Qty: 30 | Refills: 0
Start: 2024-04-19 | End: 2024-05-18

## 2024-04-19 RX ORDER — CLOZAPINE 150 MG/1
2 TABLET, ORALLY DISINTEGRATING ORAL
Qty: 60 | Refills: 0
Start: 2024-04-19 | End: 2024-05-18

## 2024-04-19 RX ORDER — CLOZAPINE 150 MG/1
2.5 TABLET, ORALLY DISINTEGRATING ORAL
Qty: 75 | Refills: 0
Start: 2024-04-19 | End: 2024-05-18

## 2024-04-19 RX ORDER — BENZTROPINE MESYLATE 1 MG
1.5 TABLET ORAL
Qty: 45 | Refills: 0
Start: 2024-04-19 | End: 2024-05-18

## 2024-04-21 NOTE — BH CONSULTATION LIAISON ASSESSMENT NOTE - NSBHATTESTSTAFFAMEND_PSY_A_CORE
oral I have personally seen and examined this patient. I fully participated in the care of this patient. I have made amendments to the documentation where appropriate and otherwise agree with the history, physical exam, and plan as documented by the

## 2024-05-22 RX ORDER — CLOZAPINE 150 MG/1
1 TABLET, ORALLY DISINTEGRATING ORAL
Qty: 30 | Refills: 0
Start: 2024-05-22 | End: 2024-06-20

## 2024-05-22 RX ORDER — ARIPIPRAZOLE 15 MG/1
1 TABLET ORAL
Qty: 30 | Refills: 0
Start: 2024-05-22 | End: 2024-06-20

## 2024-05-22 RX ORDER — CLOZAPINE 150 MG/1
550 TABLET, ORALLY DISINTEGRATING ORAL
Refills: 0
Start: 2024-05-22

## 2024-05-22 RX ORDER — BENZTROPINE MESYLATE 1 MG
1 TABLET ORAL
Qty: 60 | Refills: 0
Start: 2024-05-22 | End: 2024-06-20

## 2024-05-22 RX ORDER — ARIPIPRAZOLE 15 MG/1
1 TABLET ORAL
Qty: 30 | Refills: 0
Start: 2024-05-22 | End: 2024-07-17

## 2024-05-22 RX ORDER — BUPROPION HYDROCHLORIDE 150 MG/1
1 TABLET, EXTENDED RELEASE ORAL
Qty: 15 | Refills: 0
Start: 2024-05-22 | End: 2024-06-05

## 2024-05-22 RX ORDER — CLOZAPINE 150 MG/1
2 TABLET, ORALLY DISINTEGRATING ORAL
Qty: 60 | Refills: 0
Start: 2024-05-22 | End: 2024-06-20

## 2024-05-22 RX ORDER — CLOZAPINE 150 MG/1
2.5 TABLET, ORALLY DISINTEGRATING ORAL
Qty: 75 | Refills: 0
Start: 2024-05-22 | End: 2024-06-20

## 2024-05-22 RX ORDER — BENZTROPINE MESYLATE 1 MG
1.5 TABLET ORAL
Qty: 45 | Refills: 0
Start: 2024-05-22 | End: 2024-06-20

## 2024-06-03 RX ORDER — DESVENLAFAXINE 50 MG/1
1 TABLET, EXTENDED RELEASE ORAL
Qty: 15 | Refills: 0
Start: 2024-06-03 | End: 2024-06-17

## 2024-06-03 RX ORDER — BUPROPION HYDROCHLORIDE 150 MG/1
1 TABLET, EXTENDED RELEASE ORAL
Refills: 0
Start: 2024-06-03

## 2024-06-03 RX ORDER — BUPROPION HYDROCHLORIDE 150 MG/1
1 TABLET, EXTENDED RELEASE ORAL
Qty: 15 | Refills: 0
Start: 2024-06-03 | End: 2024-06-17

## 2024-06-18 RX ORDER — ARIPIPRAZOLE 15 MG/1
1 TABLET ORAL
Qty: 30 | Refills: 0
Start: 2024-06-18 | End: 2024-07-17

## 2024-06-18 RX ORDER — CLOZAPINE 150 MG/1
1 TABLET, ORALLY DISINTEGRATING ORAL
Qty: 30 | Refills: 0
Start: 2024-06-18 | End: 2024-07-17

## 2024-06-18 RX ORDER — DESVENLAFAXINE 50 MG/1
50 TABLET, EXTENDED RELEASE ORAL
Qty: 30 | Refills: 0
Start: 2024-06-18 | End: 2024-07-17

## 2024-06-18 RX ORDER — BENZTROPINE MESYLATE 1 MG
1 TABLET ORAL
Qty: 30 | Refills: 0
Start: 2024-06-18 | End: 2024-07-17

## 2024-06-18 RX ORDER — CLOZAPINE 150 MG/1
2.5 TABLET, ORALLY DISINTEGRATING ORAL
Qty: 75 | Refills: 0
Start: 2024-06-18 | End: 2024-07-17

## 2025-01-18 NOTE — BH CONSULTATION LIAISON ASSESSMENT NOTE - CURRENT MEDICATION
Statement Selected MEDICATIONS  (STANDING):  ARIPiprazole 10 milliGRAM(s) Oral <User Schedule>  benztropine 2 milliGRAM(s) Oral at bedtime  buPROPion XL (24-Hour) . 300 milliGRAM(s) Oral <User Schedule>  chlorhexidine 2% Cloths 1 Application(s) Topical <User Schedule>  cloZAPine 550 milliGRAM(s) Oral <User Schedule>  influenza   Vaccine 0.5 milliLiter(s) IntraMuscular once    MEDICATIONS  (PRN):